# Patient Record
(demographics unavailable — no encounter records)

---

## 2024-10-09 NOTE — REVIEW OF SYSTEMS
[Feeling Poorly] : not feeling poorly [Feeling Tired] : not feeling tired [Eyesight Problems] : no eyesight problems [Nosebleeds] : no nosebleeds [Chest Pain] : no chest pain [Lower Ext Edema] : no extremity edema [Shortness Of Breath] : no shortness of breath [Wheezing] : no wheezing [Abdominal Pain] : no abdominal pain [Vomiting] : no vomiting [Hesitancy] : urinary hesitancy [Dizziness] : no dizziness [Fainting] : no fainting [Anxiety] : no anxiety [Depression] : no depression [Easy Bleeding] : no tendency for easy bleeding [Easy Bruising] : no tendency for easy bruising

## 2024-10-09 NOTE — PHYSICAL EXAM
[General Appearance - Alert] : alert [Sclera] : the sclera and conjunctiva were normal [Hearing Threshold Finger Rub Not Juab] : hearing was normal [Neck Cervical Mass (___cm)] : no neck mass was observed [Exaggerated Use Of Accessory Muscles For Inspiration] : no accessory muscle use [Auscultation Breath Sounds / Voice Sounds] : lungs were clear to auscultation bilaterally [Heart Sounds] : normal S1 and S2 [Heart Sounds Gallop] : no gallops [Edema] : there was no peripheral edema [No CVA Tenderness] : no ~M costovertebral angle tenderness [Abnormal Walk] : normal gait [Oriented To Time, Place, And Person] : oriented to person, place, and time [Impaired Insight] : insight and judgment were intact

## 2024-10-09 NOTE — HISTORY OF PRESENT ILLNESS
[FreeTextEntry1] : 2/6/24 -- Today I had the pleasure of meeting Paulino Jameson.  He is accompanied today by his wife.  He is originally from [place].  He is an avid reader, sports fan, athlete when he was younger.  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  He is here today for evaluation of an elevated creatinine prior to planned coronary angiogram.  The patient has history of HTN, pre-diabetes, one stent and mitral valve repair 10 years ago.  His creatining at baseline going back to 2017 ranges from about 1.4 to 1.6 and more recently was 1.6.  He reports no history of gout, stones, no family history of kidney disease.  On evaluation today he denies cp nvd sob orthopnea, urinary complaints.    10/9/24 -- Paulino is doing well.  He has had recent blood work showing Cr is at 1.7 which is slightly above baseline for him.  He has had some urinary hesitancy but otherwise he feels good.

## 2024-10-09 NOTE — ASSESSMENT
[FreeTextEntry1] : 76 y/o man here for evaluation of CKD  Chronic Kidney Disease Stage 3a -- No proteinuria.  Etiology is ultimately unclear.  No classic risk factors.  Is being monitored for MGUS / CLL.  We spent most of the visit discussing chronic kidney disease, its stages, risk for progression and strategies for prevention including avoidance of NSAIDs and notifying me of medication changes. Reviewed 4 variable KFRE and his 5 year risk for kidney failure is < 1 %.  Plan will be to repeat renal panel and albumin cr ratio today.  RTO again in six months.   The time spent is inclusive of the time it took to see, evaluate, and manage the patient.  Time is inclusive of the time taken to review chart, reconcile medications, document findings, and communicate with other providers (when applicable).

## 2024-11-20 NOTE — ASSESSMENT
[FreeTextEntry1] : 77 year old man with a history of CAD status post HENRI to the mid LCx 8/2014 and HENRI to mLAD 2/14/2024, mitral valve prolapse status post mitral annuloplasty 6/19/2015, HTN, hyperlipidemia, hypothyroidism, family history of CAD, former tobacco use, renal insufficiency, diet-controlled diabetes mellitus, prior COVID infection, elevated WBC, and paroxysmal atrial fibrillation, restrictive lung disease  Reports weight loss over the last few months ~12lbs.  Uses Trelegy daily with reliable relief.  Home sleep study - AHI 10 - April 2024  On exam ,lungs are clear, in NAD.  Care plans discussed - will continue daily Trelegy (100mcg/62.5mcg/25 mcg) 1 puff Daily. Follow-up in 3 months.

## 2024-11-20 NOTE — HISTORY OF PRESENT ILLNESS
[Former] : former [Never] : never [TextBox_4] : 77 year old man with a history of CAD status post HENRI to the mid LCx 8/2014 and HENRI to mLAD 2/14/2024, mitral valve prolapse status post mitral annuloplasty 6/19/2015, HTN, hyperlipidemia, hypothyroidism, family history of CAD, former tobacco use, renal insufficiency, diet-controlled diabetes mellitus, prior COVID infection, elevated WBC, and paroxysmal atrial fibrillation, restrictive lung disease

## 2024-12-07 NOTE — CARDIOLOGY SUMMARY
[___] : [unfilled] [LVEF ___%] : LVEF [unfilled]% [de-identified] : 12/7/2024: Sinus Bradycardia at 57 bpm [de-identified] : Exercise Nuclear Stress Test performed on 1/31/2024: There is a medium to large sized moderate to severe defect in the mid inferior wall that is partially reversible consistent with infarct with at least moderate juan miguel-infarct ischemia. There are medium sized, moderate defects in the basal lateral, basal to mid inferolateral, basal to mid inferoseptal walls that are predominantly fixed, suggestive of scarring. Stress electrocardiogram: No ischemic ST segment changes. Hypokinesis of the basal lateral, basal to mid inferior, and basal inferolateral walls. The post stress left ventricular EF is 51 %. Occasional VPD's occurred during stress and recovery, increased with stress.   Exercise Nuclear Stress Test performed on 4/20/2022: 8 METS. EF >70% revealing overall preserved left ventricular ejection fraction with hypokinesis of the basal lateral, basal inferolateral, and basal to mid inferior walls. There is paradoxical septal motion consistent with prior cardiac surgery. Medium sized, moderate defects of the basal lateral, basal to mid inferolateral, basal to mid inferoseptal walls, and basal to mid inferior walls that are predominantly fixed and partially corrected with prone imaging, suggestive of infarct with mild juan miguel-infarct ischemia of the mid inferior wall.  [de-identified] : 1/22/2024: Grossly normal left ventricular systolic function. There is paradoxical septal motion, consistent with prior cardiac surgery. EF is approximately 60%. There is moderate (grade 2) left ventricular diastolic dysfunction. Right ventricular enlargement with low normal right ventricular systolic function. The left atrium is moderately dilated. The right atrium is mildly dilated in size. Mitral annular calcification. There is an annuloplasty ring seen in the mitral position. Peak mitral valve gradient equals 15.4 mm Hg, mean trans mitral valve gradient equals 4 mm Hg, which is normal in the setting of a an annuloplasty ring seen in the mitral position. Calcified aortic valve with decreased opening. The aortic valve area is approximately 1.1 sqcm, by the cotinuity equation, which is consistent with moderate aortic stenosis. Trace aortic regurgitation. Moderate tricuspid regurgitation. PASP= 39 mmHg, consistent with borderline pulmonary hypertension.   3/1/2023: Grossly preserved left ventricular ejection fraction with paradoxical septal motion, consistent with prior cardiac surgery. EF is approximately 60%. Concentric left ventricular remodeling. Moderate left ventricular diastolic dysfunction.  Right ventricular enlargement with low normal right ventricular systolic function. The left atrium is moderately dilated in size. The right atrium is moderately dilated in size.  An annuloplasty ring is seen in the mitral position. Peak transmitral valve gradient is 17 mmHg with a mean gradient of 4.7 mmHg, which is probably normal in the presence of an annuloplasty ring. Mild mitral regurgitation. Calcified aortic valve with decreased opening. The aortic valve area is approximately 1.1 sqcm, by the continuity equation, consistent with moderate aortic stenosis. Mild aortic regurgitation. Mild to moderate tricuspid regurgitation. No echocardiographic evidence of pulmonary hypertension. PASP= 22 mmHg. Mild-moderate tricuspid regurgitation. There is mild calcification of the mitral valve annulus. [de-identified] : Cardiac Catheterization 2/14/2024: HENRI to mLAD. FRONTIER RX 2.75 X 26 LM: Left main artery: Angiography shows no disease.   LAD: Mid left anterior descending: There is a 90 % stenosis.   CX: Mid circumflex: Angiography shows minor irregularities. The previously placed stent is a drug-eluting stent and is patent. RCA: Mid right coronary artery: Angiography shows mild atherosclerosis. The previously placed stent is a drug-eluting stent and is patent.

## 2024-12-07 NOTE — PHYSICAL EXAM
[General Appearance - Well Developed] : well developed [Normal Appearance] : normal appearance [Well Groomed] : well groomed [General Appearance - Well Nourished] : well nourished [No Deformities] : no deformities [General Appearance - In No Acute Distress] : no acute distress [Normal Conjunctiva] : the conjunctiva exhibited no abnormalities [Normal Oral Mucosa] : normal oral mucosa [No Oral Pallor] : no oral pallor [No Oral Cyanosis] : no oral cyanosis [Normal Jugular Venous A Waves Present] : normal jugular venous A waves present [Normal Jugular Venous V Waves Present] : normal jugular venous V waves present [Respiration, Rhythm And Depth] : normal respiratory rhythm and effort [Exaggerated Use Of Accessory Muscles For Inspiration] : no accessory muscle use [Auscultation Breath Sounds / Voice Sounds] : lungs were clear to auscultation bilaterally [Bowel Sounds] : normal bowel sounds [Abdomen Soft] : soft [Abdomen Tenderness] : non-tender [Abnormal Walk] : normal gait [Gait - Sufficient For Exercise Testing] : the gait was sufficient for exercise testing [Nail Clubbing] : no clubbing of the fingernails [Cyanosis, Localized] : no localized cyanosis [Petechial Hemorrhages (___cm)] : no petechial hemorrhages [Skin Color & Pigmentation] : normal skin color and pigmentation [] : no rash [No Skin Ulcers] : no skin ulcer [Oriented To Time, Place, And Person] : oriented to person, place, and time [Affect] : the affect was normal [Mood] : the mood was normal [No Anxiety] : not feeling anxious [Bradycardia] : bradycardic [Rhythm Regular] : regular [Normal S1] : normal S1 [Normal S2] : normal S2 [II] : a grade 2 [No Pitting Edema] : no pitting edema present [FreeTextEntry1] : Extraocular muscles intact. Anicteric sclerae.  [Normal Rate] : normal [S3] : no S3 [Right Carotid Bruit] : no bruit heard over the right carotid [Left Carotid Bruit] : no bruit heard over the left carotid [Bruit] : no bruit heard

## 2024-12-07 NOTE — HISTORY OF PRESENT ILLNESS
[FreeTextEntry1] : Patient is a 77 year old man with a history of CAD status post HENRI to the mid LCx 8/2014 and HENRI to mLAD 2/14/2024, mitral valve prolapse status post mitral annuloplasty 6/19/2015, HTN, hyperlipidemia, hypothyroidism, family history of CAD, former tobacco use, renal insufficiency, diet-controlled diabetes mellitus, prior COVID infection, elevated WBC, and paroxysmal atrial fibrillation who presents today for follow up of CAD, paroxysmal atrial fibrillation, and HTN. He had a cardiac catheterization and PCI, HENRI to mLAD on 2/14/2024. He states that earlier in the week he developed congestion, but no fevers. He states that his breathing is improved on Trelegy. He also reports taking Vitamin D 3000 units on a daily basis. He otherwise denies any exertional chest pain, shortness of breath at rest, palpitations, headaches or dizziness.

## 2024-12-07 NOTE — DISCUSSION/SUMMARY
[FreeTextEntry1] : IMPRESSION: Mr. Jameson is a 77 year old man with a history of CAD status post HENRI to the mid LCX 8/2014 and recently HENRI to mLAD 2/14/2024, HTN, hyperlipidemia, hypothyroidism, family history of CAD, former tobacco use, mitral valve prolapse  status post mitral valve repair on 6/19/2015, renal insufficiency, diet-controlled diabetes mellitus, elevated WBC count, prior COVID infection in February 2021, and paroxysmal atrial fibrillation who presents today for follow up of HTN, CAD, paroxysmal atrial fibrillation.  PLAN: 1. He is adequately rate controlled, thus he will continue on Metoprolol 25 mg four times a day. His ECG was in sinus bradycardia without any ectopy. He did not have any ectopy on exam, either. He will continue on Metoprolol four times/day.  2. His INR was therapeutic at 2.2 on 11/27. He will continue on Coumadin 2.5 mg six days a week and 5 mg on Thursdays. daily and he will have a repeat INR in 2 weeks.  3. He will continue on Plavix 75 mg daily status post stent placement 6 months ago. He will not stop taking Plavix. I will be checking a CMP today to evaluate his creatinine.  4. His blood pressure is fine today. He will continue on Amlodipine 2.5 mg twice daily and Lopressor 25 mg four times a day in addition to diet modification.  5. He will continue on Atorvastatin 40 mg daily given his dyslipidemia for a goal LDL of less than 70. I will be checking a CMP and lipid profile today.  6. He requires antibiotic prophylaxis prior to dental procedures.  7. He will continue on diet modification given his diet-controlled diabetes mellitus.   8. He will continue to follow up with Hematology given his elevated WBC count.  9. He will follow up with me in 4 months or sooner should he experience any symptoms in the interim. [EKG obtained to assist in diagnosis and management of assessed problem(s)] : EKG obtained to assist in diagnosis and management of assessed problem(s)

## 2024-12-07 NOTE — CARDIOLOGY SUMMARY
[___] : [unfilled] [LVEF ___%] : LVEF [unfilled]% [de-identified] : 12/7/2024: Sinus Bradycardia at 57 bpm [de-identified] : Exercise Nuclear Stress Test performed on 1/31/2024: There is a medium to large sized moderate to severe defect in the mid inferior wall that is partially reversible consistent with infarct with at least moderate juan miguel-infarct ischemia. There are medium sized, moderate defects in the basal lateral, basal to mid inferolateral, basal to mid inferoseptal walls that are predominantly fixed, suggestive of scarring. Stress electrocardiogram: No ischemic ST segment changes. Hypokinesis of the basal lateral, basal to mid inferior, and basal inferolateral walls. The post stress left ventricular EF is 51 %. Occasional VPD's occurred during stress and recovery, increased with stress.   Exercise Nuclear Stress Test performed on 4/20/2022: 8 METS. EF >70% revealing overall preserved left ventricular ejection fraction with hypokinesis of the basal lateral, basal inferolateral, and basal to mid inferior walls. There is paradoxical septal motion consistent with prior cardiac surgery. Medium sized, moderate defects of the basal lateral, basal to mid inferolateral, basal to mid inferoseptal walls, and basal to mid inferior walls that are predominantly fixed and partially corrected with prone imaging, suggestive of infarct with mild juan miguel-infarct ischemia of the mid inferior wall.  [de-identified] : 1/22/2024: Grossly normal left ventricular systolic function. There is paradoxical septal motion, consistent with prior cardiac surgery. EF is approximately 60%. There is moderate (grade 2) left ventricular diastolic dysfunction. Right ventricular enlargement with low normal right ventricular systolic function. The left atrium is moderately dilated. The right atrium is mildly dilated in size. Mitral annular calcification. There is an annuloplasty ring seen in the mitral position. Peak mitral valve gradient equals 15.4 mm Hg, mean trans mitral valve gradient equals 4 mm Hg, which is normal in the setting of a an annuloplasty ring seen in the mitral position. Calcified aortic valve with decreased opening. The aortic valve area is approximately 1.1 sqcm, by the cotinuity equation, which is consistent with moderate aortic stenosis. Trace aortic regurgitation. Moderate tricuspid regurgitation. PASP= 39 mmHg, consistent with borderline pulmonary hypertension.   3/1/2023: Grossly preserved left ventricular ejection fraction with paradoxical septal motion, consistent with prior cardiac surgery. EF is approximately 60%. Concentric left ventricular remodeling. Moderate left ventricular diastolic dysfunction.  Right ventricular enlargement with low normal right ventricular systolic function. The left atrium is moderately dilated in size. The right atrium is moderately dilated in size.  An annuloplasty ring is seen in the mitral position. Peak transmitral valve gradient is 17 mmHg with a mean gradient of 4.7 mmHg, which is probably normal in the presence of an annuloplasty ring. Mild mitral regurgitation. Calcified aortic valve with decreased opening. The aortic valve area is approximately 1.1 sqcm, by the continuity equation, consistent with moderate aortic stenosis. Mild aortic regurgitation. Mild to moderate tricuspid regurgitation. No echocardiographic evidence of pulmonary hypertension. PASP= 22 mmHg. Mild-moderate tricuspid regurgitation. There is mild calcification of the mitral valve annulus. [de-identified] : Cardiac Catheterization 2/14/2024: HENRI to mLAD. FRONTIER RX 2.75 X 26 LM: Left main artery: Angiography shows no disease.   LAD: Mid left anterior descending: There is a 90 % stenosis.   CX: Mid circumflex: Angiography shows minor irregularities. The previously placed stent is a drug-eluting stent and is patent. RCA: Mid right coronary artery: Angiography shows mild atherosclerosis. The previously placed stent is a drug-eluting stent and is patent.

## 2025-03-05 NOTE — ASSESSMENT
[FreeTextEntry1] : 77 year old man with a history of CAD status post HENRI to the mid LCx 8/2014 and HENRI to mLAD 2/14/2024, mitral valve prolapse status post mitral annuloplasty 6/19/2015, HTN, hyperlipidemia, hypothyroidism, family history of CAD, former tobacco use, renal insufficiency, diet-controlled diabetes mellitus, prior COVID infection, elevated WBC, and paroxysmal atrial fibrillation, restrictive lung disease   Uses Trelegy daily with reliable relief. Working on weight loss. Recently traveled to [place], was able to walk for most of his trip without difficulty. Chronic rhinitis, well controlled.   Home sleep study - AHI 10 - April 2024  On exam ,lungs are clear, in NAD.  Care plans discussed - will continue daily Trelegy (100mcg/62.5mcg/25 mcg) 1 puff Daily. Follow-up in 3 months.

## 2025-04-05 NOTE — HISTORY OF PRESENT ILLNESS
[FreeTextEntry1] : Patient is a 77 year old man with a history of CAD status post HENRI to the mid LCx 8/2014 and HENRI to mLAD 2/14/2024, mitral valve prolapse status post mitral annuloplasty 6/19/2015, HTN, hyperlipidemia, hypothyroidism, family history of CAD, former tobacco use, renal insufficiency, diet-controlled diabetes mellitus, prior COVID infection, elevated WBC, and paroxysmal atrial fibrillation who presents today for follow up of CAD, paroxysmal atrial fibrillation, and HTN. He had a cardiac catheterization and PCI, HENRI to mLAD on 2/14/2024. He states that his breathing is improved on Trelegy. He also reports taking Vitamin D 3000 units on a daily basis. He otherwise denies any exertional chest pain, shortness of breath at rest, palpitations, headaches or dizziness.

## 2025-04-05 NOTE — CARDIOLOGY SUMMARY
[de-identified] : 4/5/2025: Sinus Bradycardia at 59 bpm [de-identified] : Exercise Nuclear Stress Test performed on 1/31/2024: There is a medium to large sized moderate to severe defect in the mid inferior wall that is partially reversible consistent with infarct with at least moderate juan miguel-infarct ischemia. There are medium sized, moderate defects in the basal lateral, basal to mid inferolateral, basal to mid inferoseptal walls that are predominantly fixed, suggestive of scarring. Stress electrocardiogram: No ischemic ST segment changes. Hypokinesis of the basal lateral, basal to mid inferior, and basal inferolateral walls. The post stress left ventricular EF is 51 %. Occasional VPD's occurred during stress and recovery, increased with stress.   Exercise Nuclear Stress Test performed on 4/20/2022: 8 METS. EF >70% revealing overall preserved left ventricular ejection fraction with hypokinesis of the basal lateral, basal inferolateral, and basal to mid inferior walls. There is paradoxical septal motion consistent with prior cardiac surgery. Medium sized, moderate defects of the basal lateral, basal to mid inferolateral, basal to mid inferoseptal walls, and basal to mid inferior walls that are predominantly fixed and partially corrected with prone imaging, suggestive of infarct with mild juan miguel-infarct ischemia of the mid inferior wall.  [de-identified] : 1/22/2024: Grossly normal left ventricular systolic function. There is paradoxical septal motion, consistent with prior cardiac surgery. EF is approximately 60%. There is moderate (grade 2) left ventricular diastolic dysfunction. Right ventricular enlargement with low normal right ventricular systolic function. The left atrium is moderately dilated. The right atrium is mildly dilated in size. Mitral annular calcification. There is an annuloplasty ring seen in the mitral position. Peak mitral valve gradient equals 15.4 mm Hg, mean trans mitral valve gradient equals 4 mm Hg, which is normal in the setting of a an annuloplasty ring seen in the mitral position. Calcified aortic valve with decreased opening. The aortic valve area is approximately 1.1 sqcm, by the cotinuity equation, which is consistent with moderate aortic stenosis. Trace aortic regurgitation. Moderate tricuspid regurgitation. PASP= 39 mmHg, consistent with borderline pulmonary hypertension.   3/1/2023: Grossly preserved left ventricular ejection fraction with paradoxical septal motion, consistent with prior cardiac surgery. EF is approximately 60%. Concentric left ventricular remodeling. Moderate left ventricular diastolic dysfunction.  Right ventricular enlargement with low normal right ventricular systolic function. The left atrium is moderately dilated in size. The right atrium is moderately dilated in size.  An annuloplasty ring is seen in the mitral position. Peak transmitral valve gradient is 17 mmHg with a mean gradient of 4.7 mmHg, which is probably normal in the presence of an annuloplasty ring. Mild mitral regurgitation. Calcified aortic valve with decreased opening. The aortic valve area is approximately 1.1 sqcm, by the continuity equation, consistent with moderate aortic stenosis. Mild aortic regurgitation. Mild to moderate tricuspid regurgitation. No echocardiographic evidence of pulmonary hypertension. PASP= 22 mmHg. Mild-moderate tricuspid regurgitation. There is mild calcification of the mitral valve annulus. [de-identified] : Cardiac Catheterization 2/14/2024: HENRI to mLAD. FRONTIER RX 2.75 X 26 LM: Left main artery: Angiography shows no disease.   LAD: Mid left anterior descending: There is a 90 % stenosis.   CX: Mid circumflex: Angiography shows minor irregularities. The previously placed stent is a drug-eluting stent and is patent. RCA: Mid right coronary artery: Angiography shows mild atherosclerosis. The previously placed stent is a drug-eluting stent and is patent.

## 2025-04-05 NOTE — DISCUSSION/SUMMARY
[EKG obtained to assist in diagnosis and management of assessed problem(s)] : EKG obtained to assist in diagnosis and management of assessed problem(s) [FreeTextEntry1] : IMPRESSION: Mr. Jameson is a 77 year old man with a history of CAD status post HENRI to the mid LCX 8/2014 and recently HENRI to mLAD 2/14/2024, HTN, hyperlipidemia, hypothyroidism, family history of CAD, former tobacco use, mitral valve prolapse  status post mitral valve repair on 6/19/2015, renal insufficiency, diet-controlled diabetes mellitus, elevated WBC count, prior COVID infection in February 2021, and paroxysmal atrial fibrillation who presents today for follow up of HTN, CAD, paroxysmal atrial fibrillation.  PLAN: 1. He is adequately rate controlled, thus he will continue on Metoprolol 25 mg four times a day. His ECG was in sinus bradycardia without any ectopy. He did not have any ectopy on exam, either. He will continue on Metoprolol four times/day.  2. His INR was therapeutic at 2.3 today. He will continue on Coumadin 2.5 mg six days a week and 5 mg on Thursdays. daily and he will have a repeat INR in 2 weeks.  3. He will continue on Plavix 75 mg daily status post stent placement 14 months ago. He will not stop taking Plavix. I will be checking a CMP today to evaluate his creatinine.  4. His blood pressure is fine today. He will continue on Amlodipine 2.5 mg twice daily and Lopressor 25 mg four times a day in addition to diet modification.  5. He will continue on Atorvastatin 40 mg daily given his dyslipidemia for a goal LDL of less than 70. I will be checking a CMP and lipid profile today.  6. He requires antibiotic prophylaxis prior to dental procedures.  7. He will continue on diet modification given his diet-controlled diabetes mellitus. His most recent hemoglobin A1c was improved.   8. He will continue to follow up with Hematology given his elevated WBC count.  9. He will follow up with me in 4 months or sooner should he experience any symptoms in the interim. 10. I have asked him to schedule an echocardiogram to follow up his valvular heart disease.

## 2025-04-05 NOTE — CARDIOLOGY SUMMARY
[de-identified] : 4/5/2025: Sinus Bradycardia at 59 bpm [de-identified] : Exercise Nuclear Stress Test performed on 1/31/2024: There is a medium to large sized moderate to severe defect in the mid inferior wall that is partially reversible consistent with infarct with at least moderate juan miguel-infarct ischemia. There are medium sized, moderate defects in the basal lateral, basal to mid inferolateral, basal to mid inferoseptal walls that are predominantly fixed, suggestive of scarring. Stress electrocardiogram: No ischemic ST segment changes. Hypokinesis of the basal lateral, basal to mid inferior, and basal inferolateral walls. The post stress left ventricular EF is 51 %. Occasional VPD's occurred during stress and recovery, increased with stress.   Exercise Nuclear Stress Test performed on 4/20/2022: 8 METS. EF >70% revealing overall preserved left ventricular ejection fraction with hypokinesis of the basal lateral, basal inferolateral, and basal to mid inferior walls. There is paradoxical septal motion consistent with prior cardiac surgery. Medium sized, moderate defects of the basal lateral, basal to mid inferolateral, basal to mid inferoseptal walls, and basal to mid inferior walls that are predominantly fixed and partially corrected with prone imaging, suggestive of infarct with mild juan miguel-infarct ischemia of the mid inferior wall.  [de-identified] : 1/22/2024: Grossly normal left ventricular systolic function. There is paradoxical septal motion, consistent with prior cardiac surgery. EF is approximately 60%. There is moderate (grade 2) left ventricular diastolic dysfunction. Right ventricular enlargement with low normal right ventricular systolic function. The left atrium is moderately dilated. The right atrium is mildly dilated in size. Mitral annular calcification. There is an annuloplasty ring seen in the mitral position. Peak mitral valve gradient equals 15.4 mm Hg, mean trans mitral valve gradient equals 4 mm Hg, which is normal in the setting of a an annuloplasty ring seen in the mitral position. Calcified aortic valve with decreased opening. The aortic valve area is approximately 1.1 sqcm, by the cotinuity equation, which is consistent with moderate aortic stenosis. Trace aortic regurgitation. Moderate tricuspid regurgitation. PASP= 39 mmHg, consistent with borderline pulmonary hypertension.   3/1/2023: Grossly preserved left ventricular ejection fraction with paradoxical septal motion, consistent with prior cardiac surgery. EF is approximately 60%. Concentric left ventricular remodeling. Moderate left ventricular diastolic dysfunction.  Right ventricular enlargement with low normal right ventricular systolic function. The left atrium is moderately dilated in size. The right atrium is moderately dilated in size.  An annuloplasty ring is seen in the mitral position. Peak transmitral valve gradient is 17 mmHg with a mean gradient of 4.7 mmHg, which is probably normal in the presence of an annuloplasty ring. Mild mitral regurgitation. Calcified aortic valve with decreased opening. The aortic valve area is approximately 1.1 sqcm, by the continuity equation, consistent with moderate aortic stenosis. Mild aortic regurgitation. Mild to moderate tricuspid regurgitation. No echocardiographic evidence of pulmonary hypertension. PASP= 22 mmHg. Mild-moderate tricuspid regurgitation. There is mild calcification of the mitral valve annulus. [de-identified] : Cardiac Catheterization 2/14/2024: HENRI to mLAD. FRONTIER RX 2.75 X 26 LM: Left main artery: Angiography shows no disease.   LAD: Mid left anterior descending: There is a 90 % stenosis.   CX: Mid circumflex: Angiography shows minor irregularities. The previously placed stent is a drug-eluting stent and is patent. RCA: Mid right coronary artery: Angiography shows mild atherosclerosis. The previously placed stent is a drug-eluting stent and is patent.

## 2025-04-05 NOTE — PHYSICAL EXAM
[FreeTextEntry1] : Extraocular muscles intact. Anicteric sclerae.  [S3] : no S3 [Right Carotid Bruit] : no bruit heard over the right carotid [Left Carotid Bruit] : no bruit heard over the left carotid [Bruit] : no bruit heard

## 2025-04-10 NOTE — PHYSICAL EXAM
[General Appearance - Alert] : alert [Sclera] : the sclera and conjunctiva were normal [Hearing Threshold Finger Rub Not Uvalde] : hearing was normal [Neck Cervical Mass (___cm)] : no neck mass was observed [Exaggerated Use Of Accessory Muscles For Inspiration] : no accessory muscle use [Auscultation Breath Sounds / Voice Sounds] : lungs were clear to auscultation bilaterally [Heart Sounds] : normal S1 and S2 [Heart Sounds Gallop] : no gallops [Edema] : there was no peripheral edema [No CVA Tenderness] : no ~M costovertebral angle tenderness [Abnormal Walk] : normal gait [Oriented To Time, Place, And Person] : oriented to person, place, and time [Impaired Insight] : insight and judgment were intact

## 2025-04-10 NOTE — HISTORY OF PRESENT ILLNESS
[FreeTextEntry1] : 2/6/24 -- Today I had the pleasure of meeting Paulino Jameson.  He is accompanied today by his wife.  He is originally from [place].  He is an avid reader, sports fan, athlete when he was younger.  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  He is here today for evaluation of an elevated creatinine prior to planned coronary angiogram.  The patient has history of HTN, pre-diabetes, one stent and mitral valve repair 10 years ago.  His creatining at baseline going back to 2017 ranges from about 1.4 to 1.6 and more recently was 1.6.  He reports no history of gout, stones, no family history of kidney disease.  On evaluation today he denies cp nvd sob orthopnea, urinary complaints.    10/9/24 -- Paulino is doing well.  He has had recent blood work showing Cr is at 1.7 which is slightly above baseline for him.  He has had some urinary hesitancy but otherwise he feels good.    4/10/25 -- Patient seen and evaluated this morning.  Reports feeling well overall. No chest pain no shortness of breath.  Doing well overall.  Planned for knee surgery.

## 2025-04-10 NOTE — ASSESSMENT
[FreeTextEntry1] : 76 y/o man here for evaluation of CKD  Chronic Kidney Disease Stage 3a -- No proteinuria.  Etiology is ultimately unclear.  No classic risk factors.  Is being monitored for MGUS / CLL.  We spent most of the visit discussing chronic kidney disease, its stages, risk for progression and strategies for prevention including avoidance of NSAIDs and notifying me of medication changes. Reviewed 4 variable KFRE and his 5 year risk for kidney failure is < 1 %.  No detectable albuminuria in the past.  Plan will be to repeat renal panel and albumin cr ratio today.  discussed the importance of optimal BP control.  We reviewed the BP readings going back many years and he has always bee above goal with the exception of a few readings.  I advised him to increase amlodipine from 5 to 10.  He is agreeable but hesitant and would like to discuss with cardiology first. RTO again in six months.   The time spent is inclusive of the time it took to see, evaluate, and manage the patient.  Time is inclusive of the time taken to review chart, reconcile medications, document findings, and communicate with other providers (when applicable).